# Patient Record
Sex: MALE | Race: WHITE | NOT HISPANIC OR LATINO | URBAN - METROPOLITAN AREA
[De-identification: names, ages, dates, MRNs, and addresses within clinical notes are randomized per-mention and may not be internally consistent; named-entity substitution may affect disease eponyms.]

---

## 2020-05-06 ENCOUNTER — AMBULATORY SURGICAL CENTER (OUTPATIENT)
Dept: URBAN - METROPOLITAN AREA SURGERY 2 | Facility: SURGERY | Age: 40
End: 2020-05-06

## 2020-05-06 ENCOUNTER — OFFICE (OUTPATIENT)
Dept: URBAN - METROPOLITAN AREA PATHOLOGY 22 | Facility: PATHOLOGY | Age: 40
End: 2020-05-06

## 2020-05-06 VITALS
HEART RATE: 70 BPM | DIASTOLIC BLOOD PRESSURE: 90 MMHG | TEMPERATURE: 97.9 F | RESPIRATION RATE: 19 BRPM | HEART RATE: 65 BPM | HEART RATE: 78 BPM | WEIGHT: 260 LBS | SYSTOLIC BLOOD PRESSURE: 144 MMHG | WEIGHT: 260 LBS | HEART RATE: 70 BPM | SYSTOLIC BLOOD PRESSURE: 144 MMHG | HEART RATE: 65 BPM | OXYGEN SATURATION: 97 % | HEIGHT: 75 IN | TEMPERATURE: 98.5 F | OXYGEN SATURATION: 97 % | DIASTOLIC BLOOD PRESSURE: 91 MMHG | SYSTOLIC BLOOD PRESSURE: 150 MMHG | SYSTOLIC BLOOD PRESSURE: 147 MMHG | SYSTOLIC BLOOD PRESSURE: 150 MMHG | TEMPERATURE: 97.9 F | RESPIRATION RATE: 18 BRPM | HEART RATE: 77 BPM | OXYGEN SATURATION: 98 % | TEMPERATURE: 98.5 F | TEMPERATURE: 97.9 F | OXYGEN SATURATION: 97 % | DIASTOLIC BLOOD PRESSURE: 96 MMHG | HEART RATE: 60 BPM | OXYGEN SATURATION: 100 % | RESPIRATION RATE: 19 BRPM | HEART RATE: 65 BPM | HEART RATE: 77 BPM | DIASTOLIC BLOOD PRESSURE: 80 MMHG | SYSTOLIC BLOOD PRESSURE: 147 MMHG | DIASTOLIC BLOOD PRESSURE: 80 MMHG | DIASTOLIC BLOOD PRESSURE: 90 MMHG | RESPIRATION RATE: 19 BRPM | HEART RATE: 78 BPM | RESPIRATION RATE: 18 BRPM | WEIGHT: 260 LBS | HEIGHT: 75 IN | DIASTOLIC BLOOD PRESSURE: 90 MMHG | SYSTOLIC BLOOD PRESSURE: 151 MMHG | RESPIRATION RATE: 18 BRPM | HEART RATE: 77 BPM | DIASTOLIC BLOOD PRESSURE: 91 MMHG | SYSTOLIC BLOOD PRESSURE: 151 MMHG | SYSTOLIC BLOOD PRESSURE: 144 MMHG | HEIGHT: 75 IN | HEART RATE: 60 BPM | SYSTOLIC BLOOD PRESSURE: 150 MMHG | OXYGEN SATURATION: 100 % | SYSTOLIC BLOOD PRESSURE: 184 MMHG | OXYGEN SATURATION: 98 % | OXYGEN SATURATION: 100 % | DIASTOLIC BLOOD PRESSURE: 91 MMHG | HEART RATE: 78 BPM | OXYGEN SATURATION: 98 % | TEMPERATURE: 98.5 F | HEART RATE: 70 BPM | SYSTOLIC BLOOD PRESSURE: 151 MMHG | SYSTOLIC BLOOD PRESSURE: 147 MMHG | SYSTOLIC BLOOD PRESSURE: 184 MMHG | DIASTOLIC BLOOD PRESSURE: 96 MMHG | DIASTOLIC BLOOD PRESSURE: 80 MMHG | HEART RATE: 60 BPM | SYSTOLIC BLOOD PRESSURE: 184 MMHG | DIASTOLIC BLOOD PRESSURE: 96 MMHG

## 2020-05-06 DIAGNOSIS — Z86.010 PERSONAL HISTORY OF COLONIC POLYPS: ICD-10-CM

## 2020-05-06 DIAGNOSIS — K63.5 POLYP OF COLON: ICD-10-CM

## 2020-05-06 PROCEDURE — 88305 TISSUE EXAM BY PATHOLOGIST: CPT | Performed by: INTERNAL MEDICINE

## 2020-05-06 PROCEDURE — 45380 COLONOSCOPY AND BIOPSY: CPT | Mod: 33 | Performed by: INTERNAL MEDICINE

## 2023-12-20 ENCOUNTER — OFFICE (OUTPATIENT)
Dept: URBAN - METROPOLITAN AREA CLINIC 19 | Facility: CLINIC | Age: 43
End: 2023-12-20

## 2023-12-20 VITALS
OXYGEN SATURATION: 98 % | SYSTOLIC BLOOD PRESSURE: 132 MMHG | WEIGHT: 268 LBS | HEART RATE: 66 BPM | DIASTOLIC BLOOD PRESSURE: 77 MMHG | HEIGHT: 75 IN

## 2023-12-20 DIAGNOSIS — R10.13 EPIGASTRIC PAIN: ICD-10-CM

## 2023-12-20 DIAGNOSIS — K63.5 POLYP OF COLON: ICD-10-CM

## 2023-12-20 DIAGNOSIS — Z90.49 ACQUIRED ABSENCE OF OTHER SPECIFIED PARTS OF DIGESTIVE TRACT: ICD-10-CM

## 2023-12-20 DIAGNOSIS — K29.50 UNSPECIFIED CHRONIC GASTRITIS WITHOUT BLEEDING: ICD-10-CM

## 2023-12-20 PROCEDURE — 99204 OFFICE O/P NEW MOD 45 MIN: CPT | Performed by: NURSE PRACTITIONER

## 2023-12-20 RX ORDER — SUCRALFATE 1 G/1
TABLET ORAL
Qty: 56 | Refills: 0 | Status: COMPLETED
Start: 2023-12-20 | End: 2024-01-29

## 2023-12-20 RX ORDER — PANTOPRAZOLE SODIUM 40 MG/1
40 TABLET, DELAYED RELEASE ORAL
Qty: 30 | Refills: 11 | Status: COMPLETED
Start: 2023-12-20 | End: 2024-01-29

## 2023-12-20 NOTE — SERVICENOTES
We discussed etiologies to include PUD, MSK in origin. He is currently on PPI, will hold 2 weeks after Stefany and come in for h. pylori testing. We decided against EGD. No red flags but he will call me if s/s worsen. We discussed going to the ER if any blood/black stool. Sent to Dr. Guzman for review. Lipase/CT was normal.

## 2023-12-20 NOTE — SERVICEHPINOTES
Mr. Jung is a 43-year-old male with PMH including TA, GERD, inguinal hernia repair (remote), cholecystectomy (remote), HTN, psoriasis, IH, h. pylori without eradication testing.
jese sawant He presents to clinic today with c/o abdominal pain.   He did have a CT abdomen and pelvis, urinalysis, CBC, CMP and lipase which were all unremarkable per patient.  I will request these records.jese Schulztates he had the onset of abdominal pain 2 months ago.  This occurred 1 week after moving his mother-in-law and lifting lots of heavy things.  It is located in the epigastric region (one spot) and also the right upper quadrant, described as a sharp pain when he is bending over or lifting at work.  He thinks maybe not having food makes it worse.  It is mostly reproducible with bending over.  No issues with twisting or breathing.  He is a pharmacist and started on pantoprazole 40 mg 30 minutes before breakfast and sucralfate 1 g t.i.d. over the last couple weeks and this has given him some relief.
jese Harmon has some bloating in the mid upper abdomen.   No melena, hematochezia, coffee-ground emesis, hematemesis, nausea, anorexia, unexplained weight loss, rectal pain, dysphagia, odynophagia, diarrhea, constipation.  He has a bowel movement daily.  No NSAIDs recently.  No Goody's or alcohol.
jese sawant  Brother with history of complicated diverticulitis requiring resection.  His wife recently had a myocardial infarction and so he has had a lot going on.  Does have a remote history of H pylori without confirmation of eradication testing.  He did have an EGD in 2008 which revealed erythema in the antrum.  His recall colonoscopy is due in 2025.

## 2024-04-24 ENCOUNTER — OFFICE (OUTPATIENT)
Dept: URBAN - METROPOLITAN AREA CLINIC 19 | Facility: CLINIC | Age: 44
End: 2024-04-24

## 2024-04-24 VITALS
OXYGEN SATURATION: 99 % | WEIGHT: 273 LBS | HEART RATE: 65 BPM | HEIGHT: 75 IN | SYSTOLIC BLOOD PRESSURE: 137 MMHG | DIASTOLIC BLOOD PRESSURE: 79 MMHG

## 2024-04-24 DIAGNOSIS — R10.84 GENERALIZED ABDOMINAL PAIN: ICD-10-CM

## 2024-04-24 PROCEDURE — 99214 OFFICE O/P EST MOD 30 MIN: CPT | Performed by: INTERNAL MEDICINE
